# Patient Record
(demographics unavailable — no encounter records)

---

## 2024-12-13 NOTE — HISTORY OF PRESENT ILLNESS
[de-identified] : This is a 37 yo woman who has noticed diastasis of the abdomen extending from the xiphoid down to below the umbilicus. She has some midback discomfort and a feeling of loss of  support. She has also noticed this diastasis starting with prominence at the umbilicus. She has no obstructive symptoms. She is interested in some liposuction and I advised her to have it done after the surgical procedure.  We discussed the benefit of the procedure to address and stabilize the small umbilical hernia and restore core muscle support that likely explains her loss of mid to lower back support. We discussed her BMI impact on the procedure and her weight loss goals.  We reviewed the steps of the procedure and the robotic approach with preperitoneal mesh use.   She is ready to schedule her procedure and has no significant interval change in her history and exam

## 2024-12-13 NOTE — PHYSICAL EXAM
[de-identified] : nad [de-identified] : ncat [de-identified] : slow unlabored breathing [de-identified] : joshuar [de-identified] : s, nt, nd, +umbilical hernia reducible within 3-4 cm wide diastasis from xiphoid to below umbilicus [de-identified] : normal gait and balance

## 2024-12-13 NOTE — ASSESSMENT
[FreeTextEntry1] : ready to schedule robotic ventral hernia repair with mesh no adl limitations or illnesses requiring further optimization will book for OR PAST Waived ok